# Patient Record
Sex: FEMALE | Race: BLACK OR AFRICAN AMERICAN | NOT HISPANIC OR LATINO
[De-identification: names, ages, dates, MRNs, and addresses within clinical notes are randomized per-mention and may not be internally consistent; named-entity substitution may affect disease eponyms.]

---

## 2017-01-04 ENCOUNTER — APPOINTMENT (OUTPATIENT)
Dept: CARDIOLOGY | Facility: CLINIC | Age: 57
End: 2017-01-04

## 2017-02-01 ENCOUNTER — APPOINTMENT (OUTPATIENT)
Dept: CARDIOLOGY | Facility: CLINIC | Age: 57
End: 2017-02-01

## 2017-02-04 ENCOUNTER — APPOINTMENT (OUTPATIENT)
Dept: MRI IMAGING | Facility: CLINIC | Age: 57
End: 2017-02-04

## 2017-03-12 ENCOUNTER — EMERGENCY (EMERGENCY)
Facility: HOSPITAL | Age: 57
LOS: 1 days | Discharge: HOME | End: 2017-03-12
Attending: EMERGENCY MEDICINE | Admitting: EMERGENCY MEDICINE
Payer: COMMERCIAL

## 2017-03-12 VITALS
RESPIRATION RATE: 18 BRPM | OXYGEN SATURATION: 98 % | SYSTOLIC BLOOD PRESSURE: 167 MMHG | DIASTOLIC BLOOD PRESSURE: 103 MMHG | HEART RATE: 91 BPM | TEMPERATURE: 99 F

## 2017-03-12 DIAGNOSIS — R51 HEADACHE: ICD-10-CM

## 2017-03-12 PROCEDURE — 99284 EMERGENCY DEPT VISIT MOD MDM: CPT | Mod: 25

## 2017-03-12 RX ORDER — OLMESARTAN MEDOXOMIL 5 MG/1
0 TABLET, FILM COATED ORAL
Qty: 0 | Refills: 0 | COMMUNITY

## 2017-03-12 RX ORDER — AMLODIPINE BESYLATE 2.5 MG/1
0 TABLET ORAL
Qty: 0 | Refills: 0 | COMMUNITY

## 2017-03-12 NOTE — ED ADULT NURSE NOTE - PMH
Active Asthma    HTN - Hypertension    Migraines    CHUY (obstructive sleep apnea)  on CPAP  Pseudotumor cerebri

## 2017-03-12 NOTE — ED ADULT NURSE NOTE - OBJECTIVE STATEMENT
56 yo F arrived to the ED c/o HTN x1 day; c/o lightheadedness/dizziness/bl blurred vision x1 day; pt did not take BP PTA; on assessment in ed bp checked on both arms; 148/90 BP on initial assessment; pt takes amlodipine and Benicar for HTN; pt reports she ran out of her Benicar today missing her daily dose; pt take 5mg amlodipine qday, today pt took 15mg amlodipine without improvement of symptoms; +photophobia; +HA, 10/10; pt took aleve @1900 PTA without relief of symptoms; pt A&Ox3, eyes closed on assessment, following commands; will continue to monitor BP

## 2017-03-13 VITALS
OXYGEN SATURATION: 100 % | RESPIRATION RATE: 16 BRPM | DIASTOLIC BLOOD PRESSURE: 92 MMHG | HEART RATE: 68 BPM | SYSTOLIC BLOOD PRESSURE: 150 MMHG

## 2017-03-13 PROCEDURE — 99284 EMERGENCY DEPT VISIT MOD MDM: CPT | Mod: 25

## 2017-03-13 PROCEDURE — 96374 THER/PROPH/DIAG INJ IV PUSH: CPT

## 2017-03-13 PROCEDURE — 93005 ELECTROCARDIOGRAM TRACING: CPT

## 2017-03-13 PROCEDURE — 96375 TX/PRO/DX INJ NEW DRUG ADDON: CPT

## 2017-03-13 RX ORDER — METOCLOPRAMIDE HCL 10 MG
10 TABLET ORAL ONCE
Qty: 0 | Refills: 0 | Status: COMPLETED | OUTPATIENT
Start: 2017-03-13 | End: 2017-03-13

## 2017-03-13 RX ORDER — SODIUM CHLORIDE 9 MG/ML
1000 INJECTION, SOLUTION INTRAVENOUS ONCE
Qty: 0 | Refills: 0 | Status: COMPLETED | OUTPATIENT
Start: 2017-03-13 | End: 2017-03-13

## 2017-03-13 RX ORDER — ACETAMINOPHEN 500 MG
1000 TABLET ORAL ONCE
Qty: 0 | Refills: 0 | Status: COMPLETED | OUTPATIENT
Start: 2017-03-13 | End: 2017-03-13

## 2017-03-13 RX ORDER — DIPHENHYDRAMINE HCL 50 MG
25 CAPSULE ORAL ONCE
Qty: 0 | Refills: 0 | Status: COMPLETED | OUTPATIENT
Start: 2017-03-13 | End: 2017-03-13

## 2017-03-13 RX ADMIN — Medication 400 MILLIGRAM(S): at 00:53

## 2017-03-13 RX ADMIN — SODIUM CHLORIDE 1000 MILLILITER(S): 9 INJECTION, SOLUTION INTRAVENOUS at 00:52

## 2017-03-13 RX ADMIN — Medication 101 MILLIGRAM(S): at 00:52

## 2017-03-13 RX ADMIN — Medication 1000 MILLIGRAM(S): at 00:53

## 2017-03-13 RX ADMIN — Medication 104 MILLIGRAM(S): at 00:52

## 2017-03-13 NOTE — ED PROVIDER NOTE - OBJECTIVE STATEMENT
57 year old with, headache, father passed away today. history of pseudotumor cerebri and this headache is on par with other headaches in the past. ran out of her benecar and was concerned her headqache could be from htn.     meds amlodipine  PMD: used to be Christy schafer 57 year old with, headache, father passed away today. history of pseudotumor cerebri and this headache is on par with other headaches in the past. ran out of her benecar and was concerned her headqache could be from htn.     does get lumbar drains for her pseudotumor but hasn't had one in for 1 year.   meds amlodipine  PMD: used to be Christy schafer  neuro: has one but doesn't remember the name 57 year old with, headache, father passed away today. history of pseudotumor cerebri and this headache is on par with other headaches in the past. ran out of her benecar and was concerned her headache could be from htn.     does get lumbar drains for her pseudotumor but hasn't had one in for 1 year.   meds amlodipine  PMD: used to be Christy schafer  neuro: has one but doesn't remember the name

## 2017-03-13 NOTE — ED PROVIDER NOTE - MEDICAL DECISION MAKING DETAILS
56 yo with hx of psedu tumor cere , htn migraines, alejandro coming with headace x several hour duration thought was due to not taking her BP meds and thought her pressure was high, pain is typical of her previous migraines, no n.v.d no cp no sob, no change in vision or gait. +photophobia. EXAM: no focal neuro findings. PLAN: migraine cocktail, IVF , ekg and reassess.

## 2017-03-13 NOTE — ED PROVIDER NOTE - PHYSICAL EXAMINATION
[facility]: A & O x 3, NAD, HEENT WNL and no facial asymmetry; lungs CTAB, heart with reg rhythm without murmur; abdomen soft obese NTND; extremities with no edema; skin with no rashes, cn 2-12 intact, neuro exam non focal with no motor or sensory deficits

## 2017-03-13 NOTE — ED PROVIDER NOTE - CARE PLAN
Principal Discharge DX:	Migraines Principal Discharge DX:	Migraines  Instructions for follow-up, activity and diet:	You may take 600mg of ibuprofen (example: motrin or advil) every 4-6 hours for baseline pain control as indicated with respect to the warnings on the label. This is an over the counter medication.  You may take 1000mg of tylenol every 6 hours for baseline pain control with respect to the warnings on the label  Follow up with your neurology doctor within 48-72 hours.   You must return for new, worsening or concerning symptoms; specifically including those listed on the attached sheet.

## 2017-03-13 NOTE — ED PROVIDER NOTE - PLAN OF CARE
You may take 600mg of ibuprofen (example: motrin or advil) every 4-6 hours for baseline pain control as indicated with respect to the warnings on the label. This is an over the counter medication.  You may take 1000mg of tylenol every 6 hours for baseline pain control with respect to the warnings on the label  Follow up with your neurology doctor within 48-72 hours.   You must return for new, worsening or concerning symptoms; specifically including those listed on the attached sheet.

## 2017-04-12 ENCOUNTER — APPOINTMENT (OUTPATIENT)
Dept: PULMONOLOGY | Facility: CLINIC | Age: 57
End: 2017-04-12

## 2017-09-12 ENCOUNTER — APPOINTMENT (OUTPATIENT)
Dept: PULMONOLOGY | Facility: CLINIC | Age: 57
End: 2017-09-12
Payer: COMMERCIAL

## 2017-09-12 VITALS
OXYGEN SATURATION: 99 % | SYSTOLIC BLOOD PRESSURE: 120 MMHG | WEIGHT: 230 LBS | DIASTOLIC BLOOD PRESSURE: 80 MMHG | BODY MASS INDEX: 39.27 KG/M2 | HEART RATE: 86 BPM | HEIGHT: 64 IN | RESPIRATION RATE: 17 BRPM

## 2017-09-12 DIAGNOSIS — J82 PULMONARY EOSINOPHILIA, NOT ELSEWHERE CLASSIFIED: ICD-10-CM

## 2017-09-12 DIAGNOSIS — R06.02 SHORTNESS OF BREATH: ICD-10-CM

## 2017-09-12 DIAGNOSIS — J45.909 UNSPECIFIED ASTHMA, UNCOMPLICATED: ICD-10-CM

## 2017-09-12 DIAGNOSIS — R76.8 OTHER SPECIFIED ABNORMAL IMMUNOLOGICAL FINDINGS IN SERUM: ICD-10-CM

## 2017-09-12 DIAGNOSIS — K21.9 GASTRO-ESOPHAGEAL REFLUX DISEASE W/OUT ESOPHAGITIS: ICD-10-CM

## 2017-09-12 DIAGNOSIS — Z01.811 ENCOUNTER FOR PREPROCEDURAL RESPIRATORY EXAMINATION: ICD-10-CM

## 2017-09-12 DIAGNOSIS — G47.33 OBSTRUCTIVE SLEEP APNEA (ADULT) (PEDIATRIC): ICD-10-CM

## 2017-09-12 DIAGNOSIS — E66.9 OBESITY, UNSPECIFIED: ICD-10-CM

## 2017-09-12 DIAGNOSIS — J30.9 ALLERGIC RHINITIS, UNSPECIFIED: ICD-10-CM

## 2017-09-12 DIAGNOSIS — Z99.89 OBSTRUCTIVE SLEEP APNEA (ADULT) (PEDIATRIC): ICD-10-CM

## 2017-09-12 PROCEDURE — 94620 PULMONARY STRESS TESTING SIMPLE: CPT

## 2017-09-12 PROCEDURE — 95012 NITRIC OXIDE EXP GAS DETER: CPT

## 2017-09-12 PROCEDURE — 99215 OFFICE O/P EST HI 40 MIN: CPT | Mod: 25

## 2017-09-12 PROCEDURE — 94010 BREATHING CAPACITY TEST: CPT | Mod: 59

## 2017-09-12 RX ORDER — AMLODIPINE BESYLATE 10 MG/1
10 TABLET ORAL
Qty: 30 | Refills: 0 | Status: ACTIVE | COMMUNITY
Start: 2017-09-08

## 2017-09-12 RX ORDER — ALBUTEROL SULFATE 90 UG/1
108 (90 BASE) AEROSOL, METERED RESPIRATORY (INHALATION)
Qty: 3 | Refills: 1 | Status: ACTIVE | COMMUNITY
Start: 2017-09-12 | End: 1900-01-01

## 2017-09-12 RX ORDER — BECLOMETHASONE DIPROPIONATE 80 UG/1
80 AEROSOL, METERED NASAL
Qty: 3 | Refills: 1 | Status: ACTIVE | COMMUNITY
Start: 2017-09-12 | End: 1900-01-01

## 2017-09-12 RX ORDER — MONTELUKAST 10 MG/1
10 TABLET, FILM COATED ORAL
Qty: 90 | Refills: 1 | Status: ACTIVE | COMMUNITY
Start: 2017-09-12 | End: 1900-01-01

## 2017-09-12 RX ORDER — BUDESONIDE AND FORMOTEROL FUMARATE DIHYDRATE 160; 4.5 UG/1; UG/1
160-4.5 AEROSOL RESPIRATORY (INHALATION) TWICE DAILY
Qty: 3 | Refills: 1 | Status: ACTIVE | COMMUNITY
Start: 2017-09-12 | End: 1900-01-01

## 2017-09-12 RX ORDER — TIOTROPIUM BROMIDE 18 UG/1
18 CAPSULE ORAL; RESPIRATORY (INHALATION)
Qty: 3 | Refills: 1 | Status: ACTIVE | COMMUNITY
Start: 2017-09-12 | End: 1900-01-01

## 2017-12-07 ENCOUNTER — APPOINTMENT (OUTPATIENT)
Dept: CARDIOLOGY | Facility: CLINIC | Age: 57
End: 2017-12-07

## 2020-08-20 PROBLEM — J82 EOSINOPHILIC ASTHMA: Status: ACTIVE | Noted: 2017-09-12
